# Patient Record
Sex: MALE | Race: WHITE | NOT HISPANIC OR LATINO
[De-identification: names, ages, dates, MRNs, and addresses within clinical notes are randomized per-mention and may not be internally consistent; named-entity substitution may affect disease eponyms.]

---

## 2022-03-01 ENCOUNTER — APPOINTMENT (OUTPATIENT)
Dept: ULTRASOUND IMAGING | Facility: CLINIC | Age: 77
End: 2022-03-01

## 2022-04-28 ENCOUNTER — APPOINTMENT (OUTPATIENT)
Dept: ORTHOPEDIC SURGERY | Facility: CLINIC | Age: 77
End: 2022-04-28
Payer: MEDICARE

## 2022-04-28 VITALS — WEIGHT: 155 LBS | HEIGHT: 69 IN | BODY MASS INDEX: 22.96 KG/M2

## 2022-04-28 DIAGNOSIS — S92.024A NONDISPLACED FRACTURE OF ANTERIOR PROCESS OF RIGHT CALCANEUS, INITIAL ENCOUNTER FOR CLOSED FRACTURE: ICD-10-CM

## 2022-04-28 PROBLEM — Z00.00 ENCOUNTER FOR PREVENTIVE HEALTH EXAMINATION: Status: ACTIVE | Noted: 2022-04-28

## 2022-04-28 PROCEDURE — 99214 OFFICE O/P EST MOD 30 MIN: CPT | Mod: 57

## 2022-04-28 PROCEDURE — 28400 CLTX CALCANEAL FX W/O MNPJ: CPT

## 2022-04-28 PROCEDURE — L4361: CPT

## 2022-04-28 NOTE — ASSESSMENT
[FreeTextEntry1] : Recommend WBAT in short CAM boot.\par Ice to affected area.\par \par Patient was instructed that they can not operate an automatic vehicle while wearing a CAM boot or cast on the right lower extremity. If operating a vehicle that requires use of a clutch, patient may not drive while wearing a CAM boot or cast on the left lower extremity.\par \par Repeat x-ray will be performed at the next office visit (RT foot 3 view)\par

## 2022-04-28 NOTE — PHYSICAL EXAM
[Right] : right foot and ankle [5___] : [facility] 5[unfilled]/5 [2+] : posterior tibialis pulse: 2+ [Normal] : saphenous nerve sensation normal [NL (40)] : plantar flexion 40 degrees [4___] : plantar flexion 4[unfilled]/5 [] : no pain when stressing lateral tarsal metatarsal joint [de-identified] : WB in lace-up brace.  [de-identified] : inversion 15 degrees [de-identified] : eversion 10 degrees [TWNoteComboBox7] : dorsiflexion 15 degrees

## 2022-04-28 NOTE — HISTORY OF PRESENT ILLNESS
[7] : 7 [3] : 3 [Dull/Aching] : dull/aching [Tingling] : tingling [Constant] : constant [Household chores] : household chores [Work] : work [Rest] : rest [Ice] : ice [Standing] : standing [Walking] : walking [Stairs] : stairs [Retired] : Work status: retired [de-identified] : Pt. is a 76 year old male who presents for evaluation of his RT ankle. Reports he fell while fixing his mailbox mid-late January 2022. He presents today weight bearing in supportive footwear, using lace up brace. He has been doing PT. Ice to affected area. No previous injury/problem with RT ankle. He presents today for a second opnion.  [] : no [FreeTextEntry1] : Right Ankle [FreeTextEntry5] : Pt is a 75 y/o male who presents today for evaluation of the right ankle. PT states that he sprained his ankle while he was grabbing mail. He was in a squat position, lost balance, and then fell on his ankle. Occurred January 2022. Pt has been receiving care from a podiatrist. Received 3 cortisone injections with minimal relief. Also received an ankle brace with minimal relief.  Pt received an MRI from [facility] which indicates a possible cyst in the peroneal tendon, as well as a bone fracture.  [FreeTextEntry6] : Numbness [FreeTextEntry7] : Rt foot [FreeTextEntry9] : Ankle Brace [de-identified] : MRI

## 2022-04-28 NOTE — DATA REVIEWED
[MRI] : MRI [Right] : of the right [Ankle] : ankle [Report was reviewed and noted in the chart] : The report was reviewed and noted in the chart [I independently reviewed and interpreted images and report] : I independently reviewed and interpreted images and report [FreeTextEntry1] : [facility] 4/14/22: Bone marrow edema in the anterior process of the calcaneus with subchondral cyst\par formation, suggesting healing of an old fracture.\par \par Mild non-insertional Achilles tendinosis, without tears.\par \par Mild tenosynovitis of the tibialis posterior tendon. Mild soft tissue edema\par around the ankle.

## 2022-05-26 ENCOUNTER — APPOINTMENT (OUTPATIENT)
Dept: ORTHOPEDIC SURGERY | Facility: CLINIC | Age: 77
End: 2022-05-26
Payer: MEDICARE

## 2022-05-26 PROCEDURE — 99024 POSTOP FOLLOW-UP VISIT: CPT

## 2022-05-26 PROCEDURE — 99213 OFFICE O/P EST LOW 20 MIN: CPT | Mod: 25

## 2022-05-26 PROCEDURE — 73620 X-RAY EXAM OF FOOT: CPT | Mod: RT

## 2022-05-26 NOTE — HISTORY OF PRESENT ILLNESS
[7] : 7 [3] : 3 [Dull/Aching] : dull/aching [Tingling] : tingling [Constant] : constant [Household chores] : household chores [Work] : work [Rest] : rest [Ice] : ice [Standing] : standing [Walking] : walking [Stairs] : stairs [Retired] : Work status: retired [de-identified] : Pt. presents for f/u of his MRI diagnosed RT anterior process calcaneus fracture after he fell while fixing his mailbox mid-late January 2022. Since last visit, WBAT in CAM boot, but wearing supportive sneaker and brace today.  [] : no [FreeTextEntry1] : Right Ankle [FreeTextEntry6] : Numbness [FreeTextEntry7] : Rt foot [FreeTextEntry9] : Ankle Brace [de-identified] : MRI

## 2022-05-26 NOTE — PHYSICAL EXAM
[NL (40)] : plantar flexion 40 degrees [2+] : posterior tibialis pulse: 2+ [Normal] : saphenous nerve sensation normal [NL (20)] : eversion 20 degrees [5___] : plantar flexion 5[unfilled]/5 [] : no pain when stressing lateral tarsal metatarsal joint [Right] : right foot [Weight -] : weightbearing [There are no fractures, subluxations or dislocations. No significant abnormalities are seen] : There are no fractures, subluxations or dislocations. No significant abnormalities are seen [de-identified] : WB in lace-up brace.  [de-identified] : inversion 20 degrees [de-identified] : eversion 10 degrees [TWNoteComboBox7] : dorsiflexion 15 degrees

## 2022-05-26 NOTE — ASSESSMENT
[FreeTextEntry1] : D/C CAM boot.\par WBAT in supportive footwear.\par Ice to affected area. \par Recommend a course of PT.

## 2022-07-14 ENCOUNTER — APPOINTMENT (OUTPATIENT)
Dept: ORTHOPEDIC SURGERY | Facility: CLINIC | Age: 77
End: 2022-07-14

## 2022-07-14 VITALS — HEIGHT: 69 IN | BODY MASS INDEX: 22.96 KG/M2 | WEIGHT: 155 LBS

## 2022-07-14 DIAGNOSIS — M19.90 UNSPECIFIED OSTEOARTHRITIS, UNSPECIFIED SITE: ICD-10-CM

## 2022-07-14 DIAGNOSIS — I10 ESSENTIAL (PRIMARY) HYPERTENSION: ICD-10-CM

## 2022-07-14 DIAGNOSIS — Z82.49 FAMILY HISTORY OF ISCHEMIC HEART DISEASE AND OTHER DISEASES OF THE CIRCULATORY SYSTEM: ICD-10-CM

## 2022-07-14 DIAGNOSIS — S92.024D NONDISPLACED FRACTURE OF ANTERIOR PROCESS OF RIGHT CALCANEUS, SUBSEQUENT ENCOUNTER FOR FRACTURE WITH ROUTINE HEALING: ICD-10-CM

## 2022-07-14 DIAGNOSIS — M25.671 STIFFNESS OF RIGHT ANKLE, NOT ELSEWHERE CLASSIFIED: ICD-10-CM

## 2022-07-14 PROCEDURE — 99213 OFFICE O/P EST LOW 20 MIN: CPT | Mod: 24

## 2022-07-14 NOTE — PHYSICAL EXAM
[NL (40)] : plantar flexion 40 degrees [5___] : eversion 5[unfilled]/5 [2+] : posterior tibialis pulse: 2+ [Normal] : saphenous nerve sensation normal [Right] : right foot [Weight -] : weightbearing [There are no fractures, subluxations or dislocations. No significant abnormalities are seen] : There are no fractures, subluxations or dislocations. No significant abnormalities are seen [] : no pain when stressing lateral tarsal metatarsal joint [FreeTextEntry8] : Mild peroneal tendon tenderness. [de-identified] : WB in lace-up brace.  [de-identified] : inversion 15 degrees [de-identified] : eversion 10 degrees [TWNoteComboBox7] : dorsiflexion 15 degrees

## 2022-07-14 NOTE — HISTORY OF PRESENT ILLNESS
[6] : 6 [Burning] : burning [Dull/Aching] : dull/aching [Localized] : localized [Tingling] : tingling [Constant] : constant [Household chores] : household chores [Work] : work [Rest] : rest [Ice] : ice [Standing] : standing [Walking] : walking [Stairs] : stairs [Retired] : Work status: retired [de-identified] : Pt. presents for f/u of his MRI diagnosed RT anterior process calcaneus fracture after he fell while fixing his mailbox mid-late January 2022. Since last visit, WBAT in CAM boot at home but wearing supportive sneaker and brace outside.  He has been going to PT. He was doing better but had a mild twisting injury in late June.  [] : no [FreeTextEntry1] : Right Ankle [FreeTextEntry6] : Numbness [FreeTextEntry9] : Ankle Brace [de-identified] : being on feet all day [de-identified] : MRI and  sonogram in MRI [de-identified] : pt doing PT but is making pain worse, was told by the therapist to start wearing the boot and wear the lace up brace out when doing activities

## 2022-07-14 NOTE — ASSESSMENT
[FreeTextEntry1] : Patient was again told to not use the cam walker.\par He can use the lace up brace as needed.\par PT and home exercises should be continued.

## 2022-09-08 ENCOUNTER — APPOINTMENT (OUTPATIENT)
Dept: ORTHOPEDIC SURGERY | Facility: CLINIC | Age: 77
End: 2022-09-08